# Patient Record
(demographics unavailable — no encounter records)

---

## 2025-07-16 NOTE — PROCEDURE
[FreeTextEntry1] : Mask fit test preformed in office today-high leakage noted Patient instructed how to tighten mask -------------------------------------- Usage days 30/30 days (100%) >= 4 hours 30 days (100%) < 4 hours 0 days (0%) Usage hours 217 hours 41 minutes Average usage (total days) 7 hours 15 minutes Average usage (days used) 7 hours 15 minutes Median usage (days used) 7 hours 16 minutes Total used hours (value since last reset - 07/14/2025) 17,996 hours AirSense 10 AutoSet Serial number [id number] Mode AutoSet Min Pressure 8 cmH2O Max Pressure 15 cmH2O EPR Fulltime EPR level 3 Response Standard Therapy Pressure - cmH2O Median: 9.2 95th percentile: 11.3 Maximum: 13.1 Leaks - L/min Median: 3.1 95th percentile: 60.0 Maximum: 95.0 Events per hour AI: 0.5 HI: 0.2 AHI: 0.7 Apnea Index Central: 0.0 Obstructive: 0.4 Unknown: 0.1 RERA Index 0.1

## 2025-07-16 NOTE — REVIEW OF SYSTEMS
[A.M. Dry Mouth] : a.m. dry mouth [A.M. Headache] : headache present upon awakening [Negative] : Psychiatric

## 2025-07-16 NOTE — ASSESSMENT
[FreeTextEntry1] : Mr. RHONDA OQUENDO is a 57-year-old male with obstructive sleep apnea. Patient is currently on treatment with PAP. Data download confirms excellent compliance. Patient continues to use treatment and is benefiting from it.  Eligible for replacement equipment will order  Patient continues to have symptoms of dry mouth in the morning likely secondary to significant leakage from poor mask fit. Advised patient to make sure mask is secure every night prior to using machine by using mask fit functionality in PAP machine.. I ordered him a different fullface mask  Pt informed on inspire and weight loss medication for treatment for SARTHAK. He will make an effort to exercise and diet in effort to lose weight.  He is not interested in a weight loss medication at this time  I ordered pt a new machine, along with a new mask Telehealth visit 1 week after receiving device

## 2025-07-16 NOTE — HISTORY OF PRESENT ILLNESS
[TextBox_4] : RHONDA OQUENDO is a 57-year-old male, with history of obstructive sleep apnea, who presents to the office for follow up evaluation. Patient reports that he continues to have symptoms of dry mouth in the morning after waking up. He denies of having any symptoms of snoring. Using PAP on a nightly basis without difficulty. Reports no symptoms of daytime sleepiness. Getting supplies regularly.  Device: [employer] AirSense S10 AutoSet Settin-15 Mask: Vitera Full face mask M  Issues: Dry mouth in the morning.   Pt is due for a new machine, he notes he spoke with his vendor, they told him he qualifies for it.

## 2025-07-16 NOTE — ADDENDUM
[FreeTextEntry1] :  I, Nargis Dean, acted solely as a scribe for Dr. Francisco Fisher M.D. on this date 07/16/2025   All medical record entries made by the Scribe were at my, Dr. Francisco Fisher M.D., direction and personally dictated by me on 07/16/2025 I have reviewed the chart and agree that the record accurately reflects my personal performance of the history, physical exam, assessment and plan. I have also personally directed, reviewed, and agreed with the chart.